# Patient Record
(demographics unavailable — no encounter records)

---

## 2024-10-14 NOTE — DISCUSSION/SUMMARY
[FreeTextEntry1] : Ms Carrera has mild COPD.  She had an exacerbation with an acute bronchitis last week but this has resolved.  She is doing well on the Trelegy and has rare use of albuterol to her.  Will continue this therapy. The patient understands and agrees with plan of care. Today's office visit encompassed 32 minutes. I conducted an extensive history, physical exam and reviewed diagnosis and treatment options including diagnostic tests,radiology studies including cat scans and the use of prescription medication.

## 2024-10-14 NOTE — REASON FOR VISIT
[Follow-Up] : a follow-up visit [Asthma] : asthma [TextEntry] : 2 weeks. Patient has no pulmonary complaints today. Patient not using CPAP machine.

## 2024-10-14 NOTE — HISTORY OF PRESENT ILLNESS
[Never] : never [TextBox_4] : 69 female  hx bronchitis and obstructive sleep apnea  did not go to [facility] acute bronchitis  today feels good no sob no cough no wheeze no chest pain no tightness remains on trelegy 1 qd rare use albuterol except last week with acute bronchitis

## 2024-11-08 NOTE — DISCUSSION/SUMMARY
[FreeTextEntry1] : 69 female morbidly obese with a history of  asthma and shortness of breath and obstructive sleep apnea..  The patient has occasional wheezing but overall stable.  Has difficulty using the aPAP.  Discussed Inspira she is not a candidate.  I would continue continue the albuterol via nebulizer as needed.  Will start theophylline 400 mg once a day to see if it affords any further relief.  Will continue Trelegy at this time. The patient understands and agrees with plan of care. Today's office visit encompassed 32 minutes which excludes teaching and separately reported services.. I conducted an extensive history, physical exam and reviewed diagnosis and treatment options including diagnostic tests,radiology studies including cat scans and the use of prescription medication.

## 2024-11-08 NOTE — REASON FOR VISIT
[Follow-Up] : a follow-up visit [Asthma] : asthma [COPD] : COPD [Shortness of Breath] : shortness of breath [Friend] : friend [TextEntry] : 1 month. Patient has SOB with and without activity and wheezing which she feels is getting worse.

## 2024-11-08 NOTE — HISTORY OF PRESENT ILLNESS
[Never] : never [TextBox_4] :  Not using CPAP regularly has own machine Wondering about Inspira + wheezing, no cough, + SOB, no chest pain or chest tightness Using albuterol regularly not helping as much Able to food shop but needs cart and does get [employer] unable to do stairs, uses machine to exercise feet ECHO shows pulm htn

## 2024-11-12 NOTE — PHYSICAL EXAM
[No Acute Distress] : no acute distress [Well Nourished] : well nourished [Well Developed] : well developed [Well-Appearing] : well-appearing [Normal Sclera/Conjunctiva] : normal sclera/conjunctiva [PERRL] : pupils equal round and reactive to light [EOMI] : extraocular movements intact [Normal Outer Ear/Nose] : the outer ears and nose were normal in appearance [Normal Oropharynx] : the oropharynx was normal [No JVD] : no jugular venous distention [No Lymphadenopathy] : no lymphadenopathy [Supple] : supple [Thyroid Normal, No Nodules] : the thyroid was normal and there were no nodules present [No Respiratory Distress] : no respiratory distress  [No Accessory Muscle Use] : no accessory muscle use [Clear to Auscultation] : lungs were clear to auscultation bilaterally [Normal Rate] : normal rate  [Regular Rhythm] : with a regular rhythm [Normal S1, S2] : normal S1 and S2 [No Murmur] : no murmur heard [No Carotid Bruits] : no carotid bruits [No Abdominal Bruit] : a ~M bruit was not heard ~T in the abdomen [No Varicosities] : no varicosities [Pedal Pulses Present] : the pedal pulses are present [No Edema] : there was no peripheral edema [No Palpable Aorta] : no palpable aorta [No Extremity Clubbing/Cyanosis] : no extremity clubbing/cyanosis [Soft] : abdomen soft [Non Tender] : non-tender [Non-distended] : non-distended [No Masses] : no abdominal mass palpated [No HSM] : no HSM [Normal Bowel Sounds] : normal bowel sounds [Normal Posterior Cervical Nodes] : no posterior cervical lymphadenopathy [Normal Anterior Cervical Nodes] : no anterior cervical lymphadenopathy [No CVA Tenderness] : no CVA  tenderness [No Spinal Tenderness] : no spinal tenderness [No Joint Swelling] : no joint swelling [Grossly Normal Strength/Tone] : grossly normal strength/tone [No Rash] : no rash [Coordination Grossly Intact] : coordination grossly intact [No Focal Deficits] : no focal deficits [Normal Gait] : normal gait [Deep Tendon Reflexes (DTR)] : deep tendon reflexes were 2+ and symmetric [Normal Affect] : the affect was normal [Normal Insight/Judgement] : insight and judgment were intact [de-identified] : obese [de-identified] : swelling of b/l LE  uses cane

## 2024-11-12 NOTE — REASON FOR VISIT
[Annual Wellness Visit] : an annual wellness visit [FreeTextEntry1] : Patient had blood work done on Thursday at [facility] in [facility] [place].

## 2024-11-12 NOTE — HEALTH RISK ASSESSMENT
[1 or 2 (0 pts)] : 1 or 2 (0 points) [Never (0 pts)] : Never (0 points) [No] : In the past 12 months have you used drugs other than those required for medical reasons? No [No falls in past year] : Patient reported no falls in the past year [0] : 2) Feeling down, depressed, or hopeless: Not at all (0) [PHQ-2 Negative - No further assessment needed] : PHQ-2 Negative - No further assessment needed [I have developed a follow-up plan documented below in the note.] : I have developed a follow-up plan documented below in the note. [Never] : Never [NO] : No [Patient declined Low Dose CT Scan] : Patient declined Low Dose CT Scan [No Retinopathy] : No retinopathy [Patient reported mammogram was normal] : Patient reported mammogram was normal [Patient reported PAP Smear was normal] : Patient reported PAP Smear was normal [Patient reported bone density results were normal] : Patient reported bone density results were normal [Patient reported colonoscopy was normal] : Patient reported colonoscopy was normal [HIV test declined] : HIV test declined [Hepatitis C test declined] : Hepatitis C test declined [Transportation] : transportation [Alone] : lives alone [Retired] : retired [High School] : high school [] :  [# Of Children ___] : has [unfilled] children [Feels Safe at Home] : Feels safe at home [Fully functional (bathing, dressing, toileting, transferring, walking, feeding)] : Fully functional (bathing, dressing, toileting, transferring, walking, feeding) [Reports normal functional visual acuity (ie: able to read med bottle)] : Reports normal functional visual acuity [Reports changes in dental health] : Reports changes in dental health [Smoke Detector] : smoke detector [Carbon Monoxide Detector] : carbon monoxide detector [Safety elements used in home] : safety elements used in home [Seat Belt] :  uses seat belt [Sunscreen] : uses sunscreen [With Patient/Caregiver] : , with patient/caregiver [Designated Healthcare Proxy] : Designated healthcare proxy [Name: ___] : Health Care Proxy's Name: [unfilled]  [Relationship: ___] : Relationship: [unfilled] [I will adhere to the patient's wishes.] : I will adhere to the patient's wishes. [Intercurrent ED visits] : went to ED [Fair] : ~his/her~ current health as fair  [Very Good] : ~his/her~  mood as very good [Little interest or pleasure doing things] : 1) Little interest or pleasure doing things [Feeling down, depressed, or hopeless] : 2) Feeling down, depressed, or hopeless [Aggressive treatment] : aggressive treatment [FreeTextEntry1] : weight loss  [de-identified] : breathing problems [de-identified] : gastro neuro  and hematology   cardiology dr ortega [Audit-CScore] : 0 [de-identified] : bike at home  [de-identified] : no [facility]] : [facility] [[id number]] : 0 [LowDoseCTScan] : 2024 [EyeExamDate] : 10/2024 dr Smith [Change in mental status noted] : No change in mental status noted [Language] : denies difficulty with language [Behavior] : denies difficulty with behavior [Learning/Retaining New Information] : denies difficulty learning/retaining new information [Handling Complex Tasks] : denies difficulty handling complex tasks [Reasoning] : denies difficulty with reasoning [Spatial Ability and Orientation] : denies difficulty with spatial ability and orientation [Sexually Active] : not sexually active [Reports changes in hearing] : Reports no changes in hearing [Reports changes in vision] : Reports no changes in vision [Travel to Developing Areas] : does not  travel to developing areas [Caregiver Concerns] : does not have caregiver concerns [MammogramDate] : 11/2023 [PapSmearDate] : 8/2023 [BoneDensityDate] : 11/15/2022 [ColonoscopyDate] : 6/12/20 [ColonoscopyComments] : q 5 [de-identified] : scat bus  [de-identified] : Rigoberto Zheng tests were normal [de-identified] : glasses//  [de-identified] : periodontal disease cleaning q three months [AdvancecareDate] : 11/12/24 [FreeTextEntry4] : I want to be resuscitated  but if there is not hope I want DNR

## 2024-11-12 NOTE — HEALTH RISK ASSESSMENT
[1 or 2 (0 pts)] : 1 or 2 (0 points) [Never (0 pts)] : Never (0 points) [No] : In the past 12 months have you used drugs other than those required for medical reasons? No [No falls in past year] : Patient reported no falls in the past year [0] : 2) Feeling down, depressed, or hopeless: Not at all (0) [PHQ-2 Negative - No further assessment needed] : PHQ-2 Negative - No further assessment needed [I have developed a follow-up plan documented below in the note.] : I have developed a follow-up plan documented below in the note. [Never] : Never [NO] : No [Patient declined Low Dose CT Scan] : Patient declined Low Dose CT Scan [No Retinopathy] : No retinopathy [Patient reported mammogram was normal] : Patient reported mammogram was normal [Patient reported PAP Smear was normal] : Patient reported PAP Smear was normal [Patient reported bone density results were normal] : Patient reported bone density results were normal [Patient reported colonoscopy was normal] : Patient reported colonoscopy was normal [HIV test declined] : HIV test declined [Hepatitis C test declined] : Hepatitis C test declined [Transportation] : transportation [Alone] : lives alone [Retired] : retired [High School] : high school [] :  [# Of Children ___] : has [unfilled] children [Feels Safe at Home] : Feels safe at home [Fully functional (bathing, dressing, toileting, transferring, walking, feeding)] : Fully functional (bathing, dressing, toileting, transferring, walking, feeding) [Reports normal functional visual acuity (ie: able to read med bottle)] : Reports normal functional visual acuity [Reports changes in dental health] : Reports changes in dental health [Smoke Detector] : smoke detector [Carbon Monoxide Detector] : carbon monoxide detector [Safety elements used in home] : safety elements used in home [Seat Belt] :  uses seat belt [Sunscreen] : uses sunscreen [With Patient/Caregiver] : , with patient/caregiver [Designated Healthcare Proxy] : Designated healthcare proxy [Name: ___] : Health Care Proxy's Name: [unfilled]  [Relationship: ___] : Relationship: [unfilled] [I will adhere to the patient's wishes.] : I will adhere to the patient's wishes. [Intercurrent ED visits] : went to ED [Fair] : ~his/her~ current health as fair  [Very Good] : ~his/her~  mood as very good [Little interest or pleasure doing things] : 1) Little interest or pleasure doing things [Feeling down, depressed, or hopeless] : 2) Feeling down, depressed, or hopeless [Aggressive treatment] : aggressive treatment [FreeTextEntry1] : weight loss  [de-identified] : breathing problems [de-identified] : gastro neuro  and hematology   cardiology dr ortega [Audit-CScore] : 0 [de-identified] : bike at home  [de-identified] : no [[facility]] : 8 [MIP4Raxhg] : 0 [LowDoseCTScan] : 2024 [EyeExamDate] : 10/2024 dr Smith [Change in mental status noted] : No change in mental status noted [Language] : denies difficulty with language [Behavior] : denies difficulty with behavior [Learning/Retaining New Information] : denies difficulty learning/retaining new information [Handling Complex Tasks] : denies difficulty handling complex tasks [Reasoning] : denies difficulty with reasoning [Spatial Ability and Orientation] : denies difficulty with spatial ability and orientation [Sexually Active] : not sexually active [Reports changes in hearing] : Reports no changes in hearing [Reports changes in vision] : Reports no changes in vision [Travel to Developing Areas] : does not  travel to developing areas [Caregiver Concerns] : does not have caregiver concerns [MammogramDate] : 11/2023 [PapSmearDate] : 8/2023 [BoneDensityDate] : 11/15/2022 [ColonoscopyDate] : 6/12/20 [ColonoscopyComments] : q 5 [de-identified] : scat bus  [de-identified] : Rigoberto Zheng tests were normal [de-identified] : glasses//  [de-identified] : periodontal disease cleaning q three months [AdvancecareDate] : 11/12/24 [FreeTextEntry4] : I want to be resuscitated  but if there is not hope I want DNR

## 2024-11-12 NOTE — HISTORY OF PRESENT ILLNESS
[de-identified] : 68 yo wf here for follow up  I am trying to get mounjaro I go to  weight loss I had ozempic and I had suicidal thoughts. I stopped it. [place] [facility] [place] [facility] [place] better with the [place].  I need refill of tramadol I only take it sparingly  I dont see rheum anymore.

## 2024-11-12 NOTE — REASON FOR VISIT
[Annual Wellness Visit] : an annual wellness visit [FreeTextEntry1] : Patient had blood work done on Thursday at [facility] in [facility].

## 2024-11-12 NOTE — ASSESSMENT
[FreeTextEntry1] : medicare wellness small fiber neuropathy follow up with neurology HLD Hypothyroidism, obesity Basic cardiovascular prevention measures are advised including regular exercise, surveillance medical examination, and prudent portion-controlled low fat diet, rich in a variety of vegetables with minimal added sugars, refined starches, and no artificially hydrogenated oils. Discussion and interpretation of previous tests , external notes( labs, radiology, specialist  , patient verbalized understanding. Prescription drug management cont all medications patient trying to get mounjaro from [facility] weight loss low b12 B12 1000 mcg/ ml given intramuscularly  and tolerated well. Patient will monitor for signs of infection at the site. Patient instructed to wash hands to prevent the spread of infection. health maintenance mammogram screening breast cancer utd 11/23  pap na bone density utd colonoscopy  utd immunizations. tdap utd 2020 pneumovax utd 2021 prev 13 2020  shingrix 2022

## 2024-11-12 NOTE — HISTORY OF PRESENT ILLNESS
[de-identified] : 68 yo wf here for follow up  I am trying to get mounjaro I go to  weight loss I had ozempic and I had suicidal thoughts. I stopped it. [place] [facility] [place] [facility] [place] better with [place].  I need refill of tramadol I only take it sparingly  I dont see rheum anymore.

## 2024-11-22 NOTE — HISTORY OF PRESENT ILLNESS
[Home] : at home, [unfilled] , at the time of the visit. [Medical Office: ([facility])___] : at the medical office located in  [Verbal consent obtained from patient] : the patient, [unfilled] [FreeTextEntry1] : follow up [de-identified] : Patient initiated communication for concern via HIPAA secure platform  (Telemedicine )  for   nausea                   using  phone           .Reviewed quarantine instructions and self isolation to limit spread of disease  as per [place] guidelines.  Patient is an established patient and has verbalized consent to be treated via telemedicine . she is nauseus. she takes zofran 8 mg tid and it is not working. she ate something a few days ago.  she doesnt  have acidic foods. she is going away and doesnt want to vomit

## 2024-11-22 NOTE — REVIEW OF SYSTEMS
[Nausea] : nausea [Abdominal Pain] : no abdominal pain [Constipation] : no constipation [Diarrhea] : diarrhea [Vomiting] : no vomiting [Heartburn] : no heartburn

## 2024-11-22 NOTE — ASSESSMENT
[FreeTextEntry1] : nausea pt does not recall why she coudl be nauseus. other than perhaps a stomach bug. she will try compazine 10 mg q 6 hr prn reviewed potential side effects ( TD).  she will not use zofran at the same time.  Sidon diet.

## 2025-01-02 NOTE — REASON FOR VISIT
[Follow-Up] : a follow-up visit [Asthma] : asthma [Sleep Apnea] : sleep apnea [Cough] : cough [COPD] : COPD [Shortness of Breath] : shortness of breath [Wheezing] : wheezing [TextEntry] : Patient states she has one more day of antibiotics.  She does not feel better, there has not been any improvement.  She still has a persistent cough, SOB, wheezing, Patient has been using her CPAP machine as much as possible due to her coughing.

## 2025-01-02 NOTE — DISCUSSION/SUMMARY
[FreeTextEntry1] : Ms. Carrera has moderate asthma and often has exacerbations.  The patient is wheezing at this time.  Will continue all therapy including albuterol nebulizer 4 to 6 hours as needed.  She will complete the Z-Imtiaz given to her by the walk-in clinic.  I favor a course of prednisone.  Will start 40 mg a day and taper by 5 mg every day to off.  Lastly she has obstructive sleep apnea but her CPAP machine often comes off at night.  She is not connected to the Internet so we cannot check any compliance data The patient understands and agrees with plan of care. Today's office visit encompassed 32 minutes which excludes teaching and separately reported services.. I conducted an extensive history, physical exam and reviewed diagnosis and treatment options including diagnostic tests,radiology studies including cat scans and the use of prescription medication.

## 2025-01-02 NOTE — PHYSICAL EXAM
[No Acute Distress] : no acute distress [Normal Oropharynx] : normal oropharynx [Normal Appearance] : normal appearance [No Neck Mass] : no neck mass [Normal Rate/Rhythm] : normal rate/rhythm [Normal S1, S2] : normal s1, s2 [No Murmurs] : no murmurs [No Resp Distress] : no resp distress [Wheeze] : wheeze [No Abnormalities] : no abnormalities [Benign] : benign [Normal Gait] : normal gait [No Clubbing] : no clubbing [No Cyanosis] : no cyanosis [No Edema] : no edema [FROM] : FROM [Normal Color/ Pigmentation] : normal color/ pigmentation [No Focal Deficits] : no focal deficits [Oriented x3] : oriented x3 [Normal Affect] : normal affect [TextBox_2] : Obese female coughing [TextBox_68] : Fair aeration with diffuse wheezing all areas

## 2025-01-02 NOTE — HISTORY OF PRESENT ILLNESS
[Never] : never [TextBox_4] : 69 female hx asthma feels poor  recent exacerbation and treated with z mary jane today feel sl better no chest pain no tightness?back ache no fever ++cough no phlegm clinic 1 week ago covid neg flu neg  sl wheeze  use apap but mask off in middle of nite feels tired during day

## 2025-02-28 NOTE — REASON FOR VISIT
[Follow-Up] : a follow-up visit [Asthma] : asthma [Shortness of Breath] : shortness of breath [Wheezing] : wheezing [TextEntry] : Patient states her wheezing has progressed and she is having SOB.

## 2025-02-28 NOTE — HISTORY OF PRESENT ILLNESS
[Never] : never [TextBox_4] : 69 female hx ch bronchitis last seen Jan 2 for exacerbation and treated taper steroids today feels good  always sob no co or tightness  no cough or phlegm

## 2025-02-28 NOTE — DISCUSSION/SUMMARY
[FreeTextEntry1] : Ms. Carrera has dyspnea on exertion.  Is likely secondary to her severe obesity.  She is on Trelegy and albuterol as needed.  She is not wheezing at this time I favor continuing same.  She is going to consider a trial of theophylline.  The patient has severe obstructive sleep apnea.  She purchased her own machine and is not sure it is working correctly I favor ordering a machine through her insurance from her sleep study done in May 2023.  She agrees.  Patient is benefiting from using the APAP therapy and should continue to do so. The patient understands and agrees with plan of care. Today's office visit encompassed 32 minutes which excludes teaching and separately reported services.. I conducted an extensive history, physical exam and reviewed diagnosis and treatment options including diagnostic tests,radiology studies including cat scans and the use of prescription medication.

## 2025-02-28 NOTE — PROCEDURE
[FreeTextEntry1] : Pulmonary function test 2/18/2025 severe restrictive disease secondary to obesity

## 2025-02-28 NOTE — PHYSICAL EXAM
[No Acute Distress] : no acute distress [Normal Oropharynx] : normal oropharynx [Normal Appearance] : normal appearance [No Neck Mass] : no neck mass [Normal Rate/Rhythm] : normal rate/rhythm [Normal S1, S2] : normal s1, s2 [No Murmurs] : no murmurs [No Resp Distress] : no resp distress [Clear to Auscultation Bilaterally] : clear to auscultation bilaterally [No Abnormalities] : no abnormalities [Benign] : benign [Normal Gait] : normal gait [No Clubbing] : no clubbing [No Cyanosis] : no cyanosis [No Edema] : no edema [FROM] : FROM [Normal Color/ Pigmentation] : normal color/ pigmentation [No Focal Deficits] : no focal deficits [Oriented x3] : oriented x3 [Normal Affect] : normal affect [TextBox_2] : Morbidly obese

## 2025-03-07 NOTE — REVIEW OF SYSTEMS
[Dysuria] : dysuria [Incontinence] : incontinence [Hematuria] : hematuria [Frequency] : frequency [Negative] : Heme/Lymph

## 2025-03-07 NOTE — PHYSICAL EXAM
[Normal] : normal rate, regular rhythm, normal S1 and S2 and no murmur heard [de-identified] : lower abdominal tenderness

## 2025-03-07 NOTE — PLAN
[FreeTextEntry1] : UTI symptoms POCT urine positive leuks and blood  start antibiotics bid x7 discussed Preventative measures such and completely emptying bladder, urination post coitus, wiping until completely dry, and showering daily.  take antibiotics until complete

## 2025-03-07 NOTE — HISTORY OF PRESENT ILLNESS
[FreeTextEntry8] : pt presents with UTI symptoms.   1 week ago started with low back pain, the next day she had complaints of nausea, vomiting, and loose stool, she denies fevers, chills, but had burning with urination, frequency, and occasionally incontinence.

## 2025-03-13 NOTE — REVIEW OF SYSTEMS
[Fatigue] : fatigue [Joint Pain] : joint pain [Joint Stiffness] : joint stiffness [Joint Swelling] : joint swelling [Muscle Weakness] : muscle weakness [Muscle Pain] : muscle pain [Back Pain] : back pain [Wheezing] : wheezing [Negative] : Cardiovascular

## 2025-03-13 NOTE — HEALTH RISK ASSESSMENT
[Never (0 pts)] : Never (0 points) [No] : In the past 12 months have you used drugs other than those required for medical reasons? No [No falls in past year] : Patient reported no falls in the past year [de-identified] : neuro PT [Audit-CScore] : 0 [de-identified] : walk [Never] : Never

## 2025-03-13 NOTE — HISTORY OF PRESENT ILLNESS
[FreeTextEntry8] : 70 yo wf hx of  anemia, cataract, obesity dry eye htn hypothyroidism low vit d , asthma, small fiber polyneuropathy,  Patient presents for possible UTI and would like blood work and b12 shot  she is active, walks, goes to PT.she denies cp sob

## 2025-03-13 NOTE — ASSESSMENT
[FreeTextEntry1] : htn hypothyroidism hld small fiber neuropathy Basic cardiovascular prevention measures are advised including regular exercise, surveillance medical examination, and prudent portion-controlled low fat diet, rich in a variety of vegetables with minimal added sugars, refined starches, and no artificially hydrogenated oils. Discussion and interpretation of previous tests , external notes( labs, radiology, specialist  , patient verbalized understanding. Prescription drug management.  renew vit d 78028 u q week 12  low b12 B12 1000 mcg/ ml given intramuscularly  left deltoid  and tolerated well. Patient will monitor for signs of infection at the site. Patient instructed to wash hands to prevent the spread of infection.

## 2025-04-14 NOTE — HISTORY OF PRESENT ILLNESS
[FreeTextEntry8] : Patient presents for a fungal infection under the breast and a possible UTI. She has a fungal infection under the breast and went to [facility] and had ketoconazole cream. Has been greatly improving and would like more ketoconazole cream if needed.   Has been having burning and increased urinary frequency for the past 1 week.  Also having lower back pain and fishy odor for the same amount of time.  Had two rounds of abx recently. would like a fungal cream for her vaginal area. Notes to have a kidney stone and does not drink a lot of water.

## 2025-04-14 NOTE — PLAN
[FreeTextEntry1] : Patient presents for a UTI symptoms and a fungal infection  #UTI U/a showing only mild blood which patient notes is common for her Will do full u/a and culture Unclear if fungal or bacterial Will observe off of abx. Will start clotrimazole cream for vaginal discomfort Advised f/u with GYN advised increased water intake  #fungal infection Dermatitis under breast improving per patient May c/w Ketoconazole PRN.  May trial Nystatin.

## 2025-04-14 NOTE — PHYSICAL EXAM
[No Acute Distress] : no acute distress [Well Nourished] : well nourished [Well Developed] : well developed [Well-Appearing] : well-appearing [Normal Sclera/Conjunctiva] : normal sclera/conjunctiva [Normal Outer Ear/Nose] : the outer ears and nose were normal in appearance [No JVD] : no jugular venous distention [No Respiratory Distress] : no respiratory distress  [No Accessory Muscle Use] : no accessory muscle use [Clear to Auscultation] : lungs were clear to auscultation bilaterally [Normal Rate] : normal rate  [Regular Rhythm] : with a regular rhythm [Normal S1, S2] : normal S1 and S2 [No Murmur] : no murmur heard [No Edema] : there was no peripheral edema [No CVA Tenderness] : no CVA  tenderness [Normal Gait] : normal gait [Normal Affect] : the affect was normal [Normal Insight/Judgement] : insight and judgment were intact

## 2025-04-14 NOTE — REVIEW OF SYSTEMS
[Dysuria] : dysuria [Frequency] : frequency [Back Pain] : back pain [Negative] : Heme/Lymph [Fever] : no fever [Chills] : no chills

## 2025-04-15 NOTE — PHYSICAL EXAM
[No Acute Distress] : no acute distress [Normal Oropharynx] : normal oropharynx [Normal Appearance] : normal appearance [No Neck Mass] : no neck mass [Normal Rate/Rhythm] : normal rate/rhythm [Normal S1, S2] : normal s1, s2 [No Murmurs] : no murmurs [No Resp Distress] : no resp distress [Clear to Auscultation Bilaterally] : clear to auscultation bilaterally [No Abnormalities] : no abnormalities [Benign] : benign [Normal Gait] : normal gait [No Clubbing] : no clubbing [No Cyanosis] : no cyanosis [No Edema] : no edema [FROM] : FROM [Normal Color/ Pigmentation] : normal color/ pigmentation [No Focal Deficits] : no focal deficits [Oriented x3] : oriented x3 [Normal Affect] : normal affect [TextBox_2] : obese  no respiratory discomfort

## 2025-04-15 NOTE — REASON FOR VISIT
[Asthma] : asthma [Sleep Apnea] : sleep apnea [Shortness of Breath] : shortness of breath [Wheezing] : wheezing [Obesity] : obesity [TextEntry] : Patient states she has SOB on exertion and wheezing.  Patient started weight watchers a week ago and has been walking.  She states her insurance will not cover a home sleep study.  They want her to have a in lab sleep study.  Patient purchased from [employer] a new CPAP last September but has not been using it due to difficulty with the mask.  It keeps on coming off.

## 2025-04-15 NOTE — HISTORY OF PRESENT ILLNESS
[Never] : never [Obstructive Sleep Apnea] : obstructive sleep apnea [CPAP:] : CPAP [TextBox_4] : 69 female hx copd and obstructive sleep apnea needs new cpap   breathing is good occ wheeze ++exercising  and has lost 11 lb on WW [TextBox_158] : [employer] [TextBox_165] : She is currently not using.

## 2025-04-15 NOTE — DISCUSSION/SUMMARY
[FreeTextEntry1] : Ms. Carrera has sleep apnea.  She needs a repeat sleep study to obtain a new machine.  Her insurance recommends an in-lab study.  Will order an in-lab split study and based on the results further recommendations will be made.  She will continue all of her other medication. The patient understands and agrees with plan of care. Today's office visit encompassed 32 minutes which excludes teaching and separately reported services.. I conducted an extensive history, physical exam and reviewed diagnosis and treatment options including diagnostic tests,radiology studies including cat scans and the use of prescription medication.

## 2025-05-09 NOTE — REVIEW OF SYSTEMS
[Earache] : no earache [Nasal Discharge] : no nasal discharge [Postnasal Drip] : postnasal drip [Negative] : Heme/Lymph [FreeTextEntry4] : head congestion

## 2025-05-09 NOTE — PHYSICAL EXAM
[Normal Oropharynx] : the oropharynx was normal [No JVD] : no jugular venous distention [No Lymphadenopathy] : no lymphadenopathy [Supple] : supple [No Respiratory Distress] : no respiratory distress  [No Accessory Muscle Use] : no accessory muscle use [Clear to Auscultation] : lungs were clear to auscultation bilaterally [Normal Rate] : normal rate  [Regular Rhythm] : with a regular rhythm [Normal S1, S2] : normal S1 and S2 [Alert and Oriented x3] : oriented to person, place, and time [de-identified] : uses RW

## 2025-05-09 NOTE — PLAN
[FreeTextEntry1] : Will check viral swab  supportive treatment at this time  pt will continue prophylactic Amoxicillin from dentist  urine sent for CX due to c/o recurrent UTI  BP in office within goal range  BP goal <140/80  healthy diet and physical activity as tolerated continue Norvasc

## 2025-05-09 NOTE — HISTORY OF PRESENT ILLNESS
[FreeTextEntry8] : Pt presents today c/o sinus infection. Pt stated she had a tooth pulled on Monday and is also taking Amoxicillin. Denies fever.

## 2025-05-12 NOTE — HISTORY OF PRESENT ILLNESS
[Home] : at home, [unfilled] , at the time of the visit. [Medical Office: ([facility])___] : at the medical office located in  [Telephone (audio)] : This telephonic visit was provided via audio only technology. [Verbal consent obtained from patient] : the patient, [unfilled] [FreeTextEntry8] : 69 year old female presenting via telephonic with complaints of positive COVID. She tested positive at pharmacy. She is requesting paxlovid.

## 2025-05-12 NOTE — PLAN
[FreeTextEntry1] : Viral URI, symptoms will likely resolve in 7-10 days but my worsen in 4-5 days. Recommend treatment focusing on decreasing severity of symptoms including salt gargles, over the counter Cepacol lozenges as needed for sore throat, over the counter saline nasal spray as needed for congestion. No antibiotics at this time. Wash hands to prevent the spread of infection, drink plenty of fluids, get appropriate rest, and maintain adequate nutrition. Follow up in 4-5 days if symptoms worsen or if symptoms persists beyond 14 days. Cough my persist for up to 3 weeks after other symptoms resolve. Advised patient to refer to emergency room if temperature 104 or greater, confusion, severe headache, neck pain, stiffness, inability to swallow, difficulty breathing, drooling, chest pain, abdominal pain, vomiting or shortness of breath.

## 2025-05-16 NOTE — HEALTH RISK ASSESSMENT
[0] : 2) Feeling down, depressed, or hopeless: Not at all (0) [PHQ-2 Negative - No further assessment needed] : PHQ-2 Negative - No further assessment needed [Never] : Never [[id number]] : 0

## 2025-05-16 NOTE — REVIEW OF SYSTEMS
[Nasal Discharge] : nasal discharge [Sore Throat] : sore throat [Postnasal Drip] : postnasal drip [Negative] : Heme/Lymph [FreeTextEntry4] : b/l middle ear effusion

## 2025-05-16 NOTE — HISTORY OF PRESENT ILLNESS
[FreeTextEntry1] : Patient presents to follow up on UTI and covid+ 5/9  [de-identified] : Presenting in office with complaints of sinus pressure, sinus pain, congestion, symptoms worsening since may 9th when started with covid symptoms. Most symptoms have resolved and is feeling better however is having lots of sinus pressure. In addition she has been wheezing more however pulmonologist has placed her on steroid pack.

## 2025-05-16 NOTE — PLAN
[FreeTextEntry1] : acute sinusitis okay to trial zpack continue with steroids will send over flonase RTO tuesday for recheck lungs- wheezing on exam and continue steroids

## 2025-05-20 NOTE — HISTORY OF PRESENT ILLNESS
[FreeTextEntry1] : Patient presents for post covid+ follow up  [de-identified] : 69 year old female presenting in office to follow up after being seen in office last week, recent diagnosis of vodi went to ED sunday found to have parainfluenza as well. She is still feeling sinusy, feels her cough is productive and feels she is still wheezing. Notified pulmonology yesterday however did not hear back from them

## 2025-05-20 NOTE — PLAN
[FreeTextEntry1] : viral URI continue with steroids provided to her by ED  continue with fluids and rest she has nebulization machine at home- will continue with nebulizers 4x daily encouraged her to do coughing and deep breathing exercises and to spit out coughed up phlegm encouraged her to continue to clear out nasal secretions as well  she will call pulmonology again today to discuss extending steroids with them for her trip I will check in with her Thursday morning

## 2025-05-20 NOTE — REVIEW OF SYSTEMS
[Nasal Discharge] : nasal discharge [Postnasal Drip] : postnasal drip [Wheezing] : wheezing [Cough] : cough [Negative] : Heme/Lymph

## 2025-06-26 NOTE — ASSESSMENT
[FreeTextEntry1] :  neuropathy low b12 B12 1000 mcg/ ml given intramuscularly  and tolerated well. Patient will monitor for signs of infection at the site. Patient instructed to wash hands to prevent the spread of infection.

## 2025-06-26 NOTE — HISTORY OF PRESENT ILLNESS
[FreeTextEntry1] : pt presents for b 12 shot  [de-identified] : 69 yo wf hx of  anemia b12 deficiency obesity hypothyroidism  asthma neuropathy here for b12 she feels well  she just had uti and covid and she is currently on zepbound and lost 10 pounds. she is happy she just went to hematology and had good blood work